# Patient Record
Sex: FEMALE | HISPANIC OR LATINO | Employment: FULL TIME
[De-identification: names, ages, dates, MRNs, and addresses within clinical notes are randomized per-mention and may not be internally consistent; named-entity substitution may affect disease eponyms.]

---

## 2022-02-14 ENCOUNTER — OFFICE VISIT (OUTPATIENT)
Dept: INTERNAL MEDICINE | Facility: OTHER | Age: 52
End: 2022-02-14
Payer: COMMERCIAL

## 2022-02-14 VITALS
HEIGHT: 63 IN | BODY MASS INDEX: 33.31 KG/M2 | TEMPERATURE: 98.3 F | SYSTOLIC BLOOD PRESSURE: 106 MMHG | HEART RATE: 68 BPM | WEIGHT: 188 LBS | OXYGEN SATURATION: 95 % | DIASTOLIC BLOOD PRESSURE: 66 MMHG

## 2022-02-14 DIAGNOSIS — E66.9 OBESITY (BMI 30-39.9): ICD-10-CM

## 2022-02-14 DIAGNOSIS — Z13.228 SCREENING FOR METABOLIC DISORDER: ICD-10-CM

## 2022-02-14 DIAGNOSIS — M05.711 RHEUMATOID ARTHRITIS INVOLVING RIGHT SHOULDER WITH POSITIVE RHEUMATOID FACTOR (HCC): ICD-10-CM

## 2022-02-14 PROCEDURE — 99204 OFFICE O/P NEW MOD 45 MIN: CPT | Mod: GC | Performed by: STUDENT IN AN ORGANIZED HEALTH CARE EDUCATION/TRAINING PROGRAM

## 2022-02-14 ASSESSMENT — PAIN SCALES - GENERAL: PAINLEVEL: 2=MINIMAL-SLIGHT

## 2022-02-14 ASSESSMENT — PATIENT HEALTH QUESTIONNAIRE - PHQ9: CLINICAL INTERPRETATION OF PHQ2 SCORE: 0

## 2022-02-15 PROBLEM — G57.40 TIBIAL NERVE LESION: Status: ACTIVE | Noted: 2017-11-03

## 2022-02-15 PROBLEM — M25.50 JOINT PAIN: Status: ACTIVE | Noted: 2020-11-03

## 2022-02-15 PROBLEM — E78.5 HYPERLIPIDEMIA: Status: ACTIVE | Noted: 2020-12-11

## 2022-02-15 PROBLEM — E66.9 OBESITY WITH BODY MASS INDEX 30 OR GREATER: Status: ACTIVE | Noted: 2020-11-03

## 2022-02-15 PROBLEM — M10.9 GOUT: Status: ACTIVE | Noted: 2020-11-03

## 2022-02-15 ASSESSMENT — ENCOUNTER SYMPTOMS
HEMOPTYSIS: 0
PSYCHIATRIC NEGATIVE: 1
DOUBLE VISION: 0
ABDOMINAL PAIN: 0
TINGLING: 0
SPUTUM PRODUCTION: 0
PALPITATIONS: 0
WEIGHT LOSS: 0
TREMORS: 0
NECK PAIN: 0
NAUSEA: 0
BACK PAIN: 0
PHOTOPHOBIA: 0
HEADACHES: 0
VOMITING: 0
CHILLS: 0
ORTHOPNEA: 0
FEVER: 0

## 2022-02-15 NOTE — PATIENT INSTRUCTIONS
Blood work to be done   Will send prescription of Methotrexate to your pharmacy   Will talk about preventive health on next follow up       Methotrexate tablets  What is this medicine?  METHOTREXATE (METH oh TREX ate) is a chemotherapy drug used to treat cancer including breast cancer, leukemia, and lymphoma. This medicine can also be used to treat psoriasis and certain kinds of arthritis.  This medicine may be used for other purposes; ask your health care provider or pharmacist if you have questions.  COMMON BRAND NAME(S): Rheumatrex, Trexall  What should I tell my health care provider before I take this medicine?  They need to know if you have any of these conditions:  · fluid in the stomach area or lungs  · if you often drink alcohol  · infection or immune system problems  · kidney disease or on hemodialysis  · liver disease  · low blood counts, like low white cell, platelet, or red cell counts  · lung disease  · radiation therapy  · stomach ulcers  · ulcerative colitis  · an unusual or allergic reaction to methotrexate, other medicines, foods, dyes, or preservatives  · pregnant or trying to get pregnant  · breast-feeding  How should I use this medicine?  Take this medicine by mouth with a glass of water. Follow the directions on the prescription label. Take your medicine at regular intervals. Do not take it more often than directed. Do not stop taking except on your doctor's advice.  Make sure you know why you are taking this medicine and how often you should take it. If this medicine is used for a condition that is not cancer, like arthritis or psoriasis, it should be taken weekly, NOT daily. Taking this medicine more often than directed can cause serious side effects, even death.  Talk to your healthcare provider about safe handling and disposal of this medicine. You may need to take special precautions.  Talk to your pediatrician regarding the use of this medicine in children. While this drug may be  prescribed for selected conditions, precautions do apply.  Overdosage: If you think you have taken too much of this medicine contact a poison control center or emergency room at once.  NOTE: This medicine is only for you. Do not share this medicine with others.  What if I miss a dose?  If you miss a dose, talk with your doctor or health care professional. Do not take double or extra doses.  What may interact with this medicine?  This medicine may interact with the following medication:  · acitretin  · aspirin and aspirin-like medicines including salicylates  · azathioprine  · certain antibiotics like penicillins, tetracycline, and chloramphenicol  · cyclosporine  · gold  · hydroxychloroquine  · live virus vaccines  · NSAIDs, medicines for pain and inflammation, like ibuprofen or naproxen  · other cytotoxic agents  · penicillamine  · phenylbutazone  · phenytoin  · probenecid  · retinoids such as isotretinoin and tretinoin  · steroid medicines like prednisone or cortisone  · sulfonamides like sulfasalazine and trimethoprim/sulfamethoxazole  · theophylline  This list may not describe all possible interactions. Give your health care provider a list of all the medicines, herbs, non-prescription drugs, or dietary supplements you use. Also tell them if you smoke, drink alcohol, or use illegal drugs. Some items may interact with your medicine.  What should I watch for while using this medicine?  Avoid alcoholic drinks.  This medicine can make you more sensitive to the sun. Keep out of the sun. If you cannot avoid being in the sun, wear protective clothing and use sunscreen. Do not use sun lamps or tanning beds/booths.  You may need blood work done while you are taking this medicine.  Call your doctor or health care professional for advice if you get a fever, chills or sore throat, or other symptoms of a cold or flu. Do not treat yourself. This drug decreases your body's ability to fight infections. Try to avoid being around  people who are sick.  This medicine may increase your risk to bruise or bleed. Call your doctor or health care professional if you notice any unusual bleeding.  Check with your doctor or health care professional if you get an attack of severe diarrhea, nausea and vomiting, or if you sweat a lot. The loss of too much body fluid can make it dangerous for you to take this medicine.  Talk to your doctor about your risk of cancer. You may be more at risk for certain types of cancers if you take this medicine.  Both men and women must use effective birth control with this medicine. Do not become pregnant while taking this medicine or until at least 1 normal menstrual cycle has occurred after stopping it. Women should inform their doctor if they wish to become pregnant or think they might be pregnant. Men should not father a child while taking this medicine and for 3 months after stopping it. There is a potential for serious side effects to an unborn child. Talk to your health care professional or pharmacist for more information. Do not breast-feed an infant while taking this medicine.  What side effects may I notice from receiving this medicine?  Side effects that you should report to your doctor or health care professional as soon as possible:  · allergic reactions like skin rash, itching or hives, swelling of the face, lips, or tongue  · breathing problems or shortness of breath  · diarrhea  · dry, nonproductive cough  · low blood counts - this medicine may decrease the number of white blood cells, red blood cells and platelets. You may be at increased risk for infections and bleeding.  · mouth sores  · redness, blistering, peeling or loosening of the skin, including inside the mouth  · signs of infection - fever or chills, cough, sore throat, pain or trouble passing urine  · signs and symptoms of bleeding such as bloody or black, tarry stools; red or dark-brown urine; spitting up blood or brown material that looks like  coffee grounds; red spots on the skin; unusual bruising or bleeding from the eye, gums, or nose  · signs and symptoms of kidney injury like trouble passing urine or change in the amount of urine  · signs and symptoms of liver injury like dark yellow or brown urine; general ill feeling or flu-like symptoms; light-colored stools; loss of appetite; nausea; right upper belly pain; unusually weak or tired; yellowing of the eyes or skin  Side effects that usually do not require medical attention (report to your doctor or health care professional if they continue or are bothersome):  · dizziness  · hair loss  · tiredness  · upset stomach  · vomiting  This list may not describe all possible side effects. Call your doctor for medical advice about side effects. You may report side effects to FDA at 4-415-WZN-7002.  Where should I keep my medicine?  Keep out of the reach of children.  Store at room temperature between 20 and 25 degrees C (68 and 77 degrees F). Protect from light. Throw away any unused medicine after the expiration date.  NOTE: This sheet is a summary. It may not cover all possible information. If you have questions about this medicine, talk to your doctor, pharmacist, or health care provider.  © 2020 Elsevier/Gold Standard (2018-08-09 13:38:43)      Rheumatoid Arthritis  Rheumatoid arthritis (RA) is a long-term (chronic) disease. RA causes inflammation in your joints. Your joints may feel painful, stiff, swollen, and warm. RA may start slowly. It most often affects the small joints of the hands and feet. It can also affect other parts of the body. Symptoms of RA often come and go.  There is no cure for RA, but medicines can help your symptoms.  What are the causes?  · RA is an autoimmune disease. This means that your body's defense system (immune system) attacks healthy parts of your body by mistake. The exact cause of RA is not known.  What increases the risk?  · Being a woman.  · Having a family history of  RA or other diseases like RA.  · Smoking.  · Being overweight.  · Being exposed to pollutants or chemicals.  What are the signs or symptoms?  · Morning stiffness that lasts longer than 30 minutes. This is often the first symptom.  · Symptoms start slowly. They are often worse in the morning.  · As RA gets worse, symptoms may include:  ? Pain, stiffness, swelling, warmth, and tenderness in joints on both sides of your body.  ? Loss of energy.  ? Not feeling hungry.  ? Weight loss.  ? A low fever.  ? Dry eyes and a dry mouth.  ? Firm lumps that grow under your skin.  ? Changes in the way your joints look.  ? Changes in the way your joints work.  · Symptoms vary and they:  ? Often come and go.  ? Sometimes get worse for a period of time. These are called flares.  How is this treated?    · Treatment may include:  ? Taking good care of yourself. Be sure to rest as needed, eat a healthy diet, and exercise.  ? Medicines. These may include:  § Pain relievers.  § Medicines to help with inflammation.  § Disease-modifying antirheumatic drugs (DMARDs).  § Medicines called biologic response modifiers.  ? Physical therapy and occupational therapy.  ? Surgery, if joint damage is very bad.  Your doctor will work with you to find the best treatments.  Follow these instructions at home:  Activity  · Return to your normal activities as told by your doctor. Ask your doctor what activities are safe for you.  · Rest when you have a flare.  · Exercise as told by your doctor.  General instructions  · Take over-the-counter and prescription medicines only as told by your doctor.  · Keep all follow-up visits as told by your doctor. This is important.  Where to find more information  · American College of Rheumatology: www.rheumatology.org  · Arthritis Foundation: www.arthritis.org  Contact a doctor if:  · You have a flare.  · You have a fever.  · You have problems because of your medicines.  Get help right away if:  · You have chest  pain.  · You have trouble breathing.  · You get a hot, painful joint all of a sudden, and it is worse than your normal joint aches.  Summary  · RA is a long-term disease.  · Symptoms of RA start slowly. They are often worse in the morning.  · RA causes inflammation in your joints.  This information is not intended to replace advice given to you by your health care provider. Make sure you discuss any questions you have with your health care provider.  Document Released: 03/11/2013 Document Revised: 08/21/2019 Document Reviewed: 08/21/2019  Elsevier Patient Education © 2020 Elsevier Inc.

## 2022-02-15 NOTE — PROGRESS NOTES
New Patient    Chief Complaint   Patient presents with   • New Patient     Arthritis       HPI:   jimi Lockett is a 51 y.o. female with a pmh of Rheumatoid arthritis (previously on methotrexate) , HLD, Obesity - BMI 33, ?SVT s/p ablation at [place] in  who presented to the clinic to establish.     She [facility] Jael Welch. Last seen 6 months ago, per patient she was unable to reach out to their office for further follow up.     Rheumatoid arthritis:  Diagnosed  , Anti CCP Ab 225, RA latex turbid 75.4 (records in [facility]). Was started on MTX by previous PCP with symptom relief. Ran out 3 weeks ago. Reports right shoulder and arm pain over the last week , has been taking Ibuprofen with some symptom relief.     Currently not taking any other medications.     Preventive Health:  Due for colonoscopy , mammogram and pap smear.   Vaccinated x 2 for COVID but not boosted.     Patient Active Problem List    Diagnosis Date Noted   • Hyperlipidemia 2020   • Gout 2020   • Joint pain 2020   • Obesity with body mass index 30 or greater 2020   • Tibial nerve lesion 2017   • Lateral epicondylitis of right elbow 2012   • Carpal tunnel syndrome of left wrist 2012   • Status post tonsillectomy 1990       Current Outpatient Medications   Medication Sig Dispense Refill   • methotrexate 2.5 MG Tab Take 6 Tablets by mouth every 7 days for 90 days. 72 Tablet 0   • ibuprofen (MOTRIN) 600 MG TABS Take 1 Tab by mouth every 8 hours as needed (for pain, take with food). 30 Each 0     No current facility-administered medications for this visit.     Social History:  Denies tobacco , illicit drug use. Ocassional EtOH use.   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     Family History:  Mother  of non alcoholic liver cirrhosis       ROS:   Review of Systems   Constitutional: Negative for chills, fever, malaise/fatigue and weight loss.  "  HENT: Negative for ear pain and tinnitus.    Eyes: Negative for double vision and photophobia.   Respiratory: Negative for hemoptysis and sputum production.    Cardiovascular: Negative for chest pain, palpitations and orthopnea.   Gastrointestinal: Negative for abdominal pain, nausea and vomiting.   Genitourinary: Negative for frequency and urgency.   Musculoskeletal: Positive for joint pain. Negative for back pain and neck pain.   Skin: Negative for rash.   Neurological: Negative for tingling, tremors and headaches.   Psychiatric/Behavioral: Negative.         /66 (BP Location: Left arm, Patient Position: Sitting, BP Cuff Size: Large adult long)   Pulse 68   Temp 36.8 °C (98.3 °F) (Temporal)   Ht 1.6 m (5' 3\")   Wt 85.3 kg (188 lb)   SpO2 95%   BMI 33.30 kg/m²     Physical Exam   Physical Exam  Vitals reviewed.   Constitutional:       General: She is not in acute distress.     Appearance: Normal appearance. She is obese.   HENT:      Head: Normocephalic.      Right Ear: Tympanic membrane normal.      Left Ear: Tympanic membrane normal.      Nose: Nose normal.      Mouth/Throat:      Mouth: Mucous membranes are dry.   Eyes:      Extraocular Movements: Extraocular movements intact.      Conjunctiva/sclera: Conjunctivae normal.      Pupils: Pupils are equal, round, and reactive to light.   Cardiovascular:      Rate and Rhythm: Normal rate and regular rhythm.      Pulses: Normal pulses.      Heart sounds: Normal heart sounds. No murmur heard.      Pulmonary:      Effort: Pulmonary effort is normal. No respiratory distress.      Breath sounds: Normal breath sounds. No wheezing.   Abdominal:      General: There is no distension.      Palpations: Abdomen is soft. There is no mass.      Tenderness: There is no abdominal tenderness.   Musculoskeletal:         General: Deformity (right elbow from remote injury ) and signs of injury (left ankle remote injury ) present.      Cervical back: Normal range of motion " and neck supple. No rigidity.   Skin:     Coloration: Skin is not jaundiced or pale.      Findings: No bruising.   Neurological:      General: No focal deficit present.      Mental Status: She is alert and oriented to person, place, and time.   Psychiatric:         Mood and Affect: Mood normal.            Note: I have reviewed all pertinent labs and diagnostic tests associated with this visit with specific comments listed under the assessment and plan below    Assessment and Plan    1. Screening for metabolic disorder  Blood work ordered. Previous notes suggest hx of HLD but patient was not taking statin.     - Comp Metabolic Panel; Future  - Lipid Profile; Future  - TSH WITH REFLEX TO FT4; Future  - VITAMIN B12; Future  - HEMOGLOBIN A1C; Future    2. Rheumatoid arthritis involving right shoulder with positive rheumatoid factor (HCC)    Diagnosed in 2020 , Anti CCP Ab 225, RA Latex turbid 75.4.   On MTX 15 mg weekly , last taken 3 weeks ago before she ran out.   We will get baseline labs, reinitiate MTX at same dose , 15 mg weekly.   Emphasized regular follow up for labs, will be checking labs every 3 months if the initial blood work returns normal.    - methotrexate 2.5 MG Tab; Take 6 Tablets by mouth every 7 days for 90 days.  Dispense: 72 Tablet; Refill: 0    3. Obesity (BMI 30-39.9)    Counseled on regular exercise and dietary modification.   Education material provided.   Labs to screen for metabolic syndrome.    - CBC WITH DIFFERENTIAL; Future  - Comp Metabolic Panel; Future  - Lipid Profile; Future       Followup: Return in about 5 weeks (around 3/21/2022).   Will discuss preventive health in detail on next f/u.     Patient seen with attending.    Signed by: Kali Jha M.D.    Please note that this dictation was created using voice recognition software. I have made every reasonable attempt to correct obvious errors, but I expect that there are errors of grammar and possibly content that I did not discover  before finalizing the note.

## 2022-02-26 ENCOUNTER — HOSPITAL ENCOUNTER (OUTPATIENT)
Dept: LAB | Facility: MEDICAL CENTER | Age: 52
End: 2022-02-26
Attending: STUDENT IN AN ORGANIZED HEALTH CARE EDUCATION/TRAINING PROGRAM
Payer: COMMERCIAL

## 2022-02-26 DIAGNOSIS — E66.9 OBESITY (BMI 30-39.9): ICD-10-CM

## 2022-02-26 DIAGNOSIS — Z13.228 SCREENING FOR METABOLIC DISORDER: ICD-10-CM

## 2022-02-26 LAB
ALBUMIN SERPL BCP-MCNC: 4 G/DL (ref 3.2–4.9)
ALBUMIN/GLOB SERPL: 1.4 G/DL
ALP SERPL-CCNC: 89 U/L (ref 30–99)
ALT SERPL-CCNC: 16 U/L (ref 2–50)
ANION GAP SERPL CALC-SCNC: 12 MMOL/L (ref 7–16)
AST SERPL-CCNC: 19 U/L (ref 12–45)
BASOPHILS # BLD AUTO: 0.9 % (ref 0–1.8)
BASOPHILS # BLD: 0.07 K/UL (ref 0–0.12)
BILIRUB SERPL-MCNC: 0.3 MG/DL (ref 0.1–1.5)
BUN SERPL-MCNC: 17 MG/DL (ref 8–22)
CALCIUM SERPL-MCNC: 9.2 MG/DL (ref 8.5–10.5)
CHLORIDE SERPL-SCNC: 109 MMOL/L (ref 96–112)
CHOLEST SERPL-MCNC: 216 MG/DL (ref 100–199)
CO2 SERPL-SCNC: 21 MMOL/L (ref 20–33)
CREAT SERPL-MCNC: 0.62 MG/DL (ref 0.5–1.4)
EOSINOPHIL # BLD AUTO: 0.24 K/UL (ref 0–0.51)
EOSINOPHIL NFR BLD: 2.9 % (ref 0–6.9)
ERYTHROCYTE [DISTWIDTH] IN BLOOD BY AUTOMATED COUNT: 44 FL (ref 35.9–50)
EST. AVERAGE GLUCOSE BLD GHB EST-MCNC: 114 MG/DL
GLOBULIN SER CALC-MCNC: 2.9 G/DL (ref 1.9–3.5)
GLUCOSE SERPL-MCNC: 88 MG/DL (ref 65–99)
HBA1C MFR BLD: 5.6 % (ref 4–5.6)
HCT VFR BLD AUTO: 41.9 % (ref 37–47)
HDLC SERPL-MCNC: 44 MG/DL
HGB BLD-MCNC: 14.1 G/DL (ref 12–16)
IMM GRANULOCYTES # BLD AUTO: 0.02 K/UL (ref 0–0.11)
IMM GRANULOCYTES NFR BLD AUTO: 0.2 % (ref 0–0.9)
LDLC SERPL CALC-MCNC: 141 MG/DL
LYMPHOCYTES # BLD AUTO: 2.69 K/UL (ref 1–4.8)
LYMPHOCYTES NFR BLD: 32.9 % (ref 22–41)
MCH RBC QN AUTO: 29.8 PG (ref 27–33)
MCHC RBC AUTO-ENTMCNC: 33.7 G/DL (ref 33.6–35)
MCV RBC AUTO: 88.6 FL (ref 81.4–97.8)
MONOCYTES # BLD AUTO: 0.78 K/UL (ref 0–0.85)
MONOCYTES NFR BLD AUTO: 9.5 % (ref 0–13.4)
NEUTROPHILS # BLD AUTO: 4.38 K/UL (ref 2–7.15)
NEUTROPHILS NFR BLD: 53.6 % (ref 44–72)
NRBC # BLD AUTO: 0 K/UL
NRBC BLD-RTO: 0 /100 WBC
PLATELET # BLD AUTO: 318 K/UL (ref 164–446)
PMV BLD AUTO: 10.8 FL (ref 9–12.9)
POTASSIUM SERPL-SCNC: 4.4 MMOL/L (ref 3.6–5.5)
PROT SERPL-MCNC: 6.9 G/DL (ref 6–8.2)
RBC # BLD AUTO: 4.73 M/UL (ref 4.2–5.4)
SODIUM SERPL-SCNC: 142 MMOL/L (ref 135–145)
TRIGL SERPL-MCNC: 157 MG/DL (ref 0–149)
TSH SERPL DL<=0.005 MIU/L-ACNC: 2.02 UIU/ML (ref 0.38–5.33)
VIT B12 SERPL-MCNC: 411 PG/ML (ref 211–911)
WBC # BLD AUTO: 8.2 K/UL (ref 4.8–10.8)

## 2022-02-26 PROCEDURE — 84443 ASSAY THYROID STIM HORMONE: CPT

## 2022-02-26 PROCEDURE — 83036 HEMOGLOBIN GLYCOSYLATED A1C: CPT

## 2022-02-26 PROCEDURE — 80053 COMPREHEN METABOLIC PANEL: CPT

## 2022-02-26 PROCEDURE — 82607 VITAMIN B-12: CPT

## 2022-02-26 PROCEDURE — 80061 LIPID PANEL: CPT

## 2022-02-26 PROCEDURE — 36415 COLL VENOUS BLD VENIPUNCTURE: CPT

## 2022-02-26 PROCEDURE — 85025 COMPLETE CBC W/AUTO DIFF WBC: CPT

## 2022-02-28 ENCOUNTER — TELEPHONE (OUTPATIENT)
Dept: INTERNAL MEDICINE | Facility: OTHER | Age: 52
End: 2022-02-28
Payer: COMMERCIAL

## 2022-03-01 NOTE — RESULT ENCOUNTER NOTE
I have reviewed your labs including blood count, metabolic panel, glycohemoglobin, Vit B12 and thyroid panel which are all unremarkable.   Your lipid panel has demonstrated elevated total cholesterol and LDL (bad cholesterol). After calculating your risk for cardiovascular event is low (1.4% in next 10 years) so I don't recommend medication to control your hyperlipidemia. Instead I would recommend healthy lifestyle changes including dietary changes (low carb/fat diet), more fruits and vegetables and regular exercise 30 mins-5 times a day.     Follow up on your scheduled appt.   Call with any questions.

## 2022-03-01 NOTE — TELEPHONE ENCOUNTER
VOICEMAIL  1. Caller Name: Melody                     Call Back Number: [phone]    2. Message: Pt lvm stating she would like a call back with lab results. pcp please advise

## 2022-03-02 ENCOUNTER — TELEPHONE (OUTPATIENT)
Dept: INTERNAL MEDICINE | Facility: OTHER | Age: 52
End: 2022-03-02
Payer: COMMERCIAL

## 2022-03-02 NOTE — TELEPHONE ENCOUNTER
Called pt on [phone] and left VM. Briefly discussed the recent labs and asked to call office for any questions.   See note in chart for lab interpretation and recommendations.   She will follow up with me 3/21/22.

## 2022-03-03 NOTE — TELEPHONE ENCOUNTER
Pt was in to see Dr Jha on 02/14/22 and was ordered a prescription that says it went to [facility] pharmacy on [address].  Pt says she has gone to the pharmacy 3 times and was told by the pharmacy that it was not ordered.  Please assist.

## 2022-03-03 NOTE — TELEPHONE ENCOUNTER
Spoke to pharmacy state they never received rx for methotrexate ,  Pharmacist took verbal , patient was notified rx called into pharmacy

## 2022-03-09 ENCOUNTER — OFFICE VISIT (OUTPATIENT)
Dept: INTERNAL MEDICINE | Facility: OTHER | Age: 52
End: 2022-03-09
Payer: COMMERCIAL

## 2022-03-09 VITALS
TEMPERATURE: 98.7 F | SYSTOLIC BLOOD PRESSURE: 104 MMHG | HEART RATE: 67 BPM | OXYGEN SATURATION: 97 % | BODY MASS INDEX: 32.99 KG/M2 | WEIGHT: 186.2 LBS | DIASTOLIC BLOOD PRESSURE: 67 MMHG | HEIGHT: 63 IN

## 2022-03-09 DIAGNOSIS — M06.9 RHEUMATOID ARTHRITIS FLARE (HCC): ICD-10-CM

## 2022-03-09 PROCEDURE — 99213 OFFICE O/P EST LOW 20 MIN: CPT | Mod: GE | Performed by: STUDENT IN AN ORGANIZED HEALTH CARE EDUCATION/TRAINING PROGRAM

## 2022-03-09 RX ORDER — PREDNISONE 2.5 MG/1
TABLET ORAL
Qty: 45 TABLET | Refills: 0 | Status: SHIPPED | OUTPATIENT
Start: 2022-03-09 | End: 2022-03-24

## 2022-03-09 ASSESSMENT — FIBROSIS 4 INDEX: FIB4 SCORE: 0.76

## 2022-03-10 ENCOUNTER — TELEPHONE (OUTPATIENT)
Dept: INTERNAL MEDICINE | Facility: OTHER | Age: 52
End: 2022-03-10
Payer: COMMERCIAL

## 2022-03-10 NOTE — PATIENT INSTRUCTIONS
-Exercise and balanced nutrition  -Take medications as prescribed  -Follow-up with specialties  -Follow-up with PCP as scheduled or return to clinic earlier if any symptoms.

## 2022-03-10 NOTE — PROGRESS NOTES
"    Established Patient    Patient Care Team:  Kali Jha M.D. as PCP - General (Internal Medicine)    Melody Lockett is a 51 y.o. female who presents today with the following Chief Complaint(s): Follow up for The encounter diagnosis was Rheumatoid arthritis flare (HCC).    HPI:    51 year old female with known hx of RA came for a follow up visit to discuss labs and also mentioned that there was a delay in receiving her methotrexate and now she is complaining of joint pains in multiple joints including small joints of hand, elbow, knees and hip. She denied any other complaints.    No problems updated.     ROS:     Denies any new chest pain or shortness of breath.  No changes to urinary or bowel function. See HPI.    No past medical history on file.  Social History     Tobacco Use   • Smoking status: Never Smoker   • Smokeless tobacco: Never Used   Substance Use Topics   • Alcohol use: No   • Drug use: No     Current Outpatient Medications   Medication Sig Dispense Refill   • prednisONE (DELTASONE) 2.5 MG Tab Take 4 Tablets by mouth every day for 5 days, THEN 3 Tablets every day for 5 days, THEN 2 Tablets every day for 5 days. 45 Tablet 0   • methotrexate 2.5 MG Tab Take 6 Tablets by mouth every 7 days for 90 days. 72 Tablet 0   • ibuprofen (MOTRIN) 600 MG TABS Take 1 Tab by mouth every 8 hours as needed (for pain, take with food). 30 Each 0     No current facility-administered medications for this visit.     Physical Exam:  /67 (BP Location: Right arm, Patient Position: Sitting, BP Cuff Size: Adult)   Pulse 67   Temp 37.1 °C (98.7 °F) (Temporal)   Ht 1.6 m (5' 3\")   Wt 84.5 kg (186 lb 3.2 oz)   SpO2 97%   BMI 32.98 kg/m²   General: Well developed, well nourished female, in no distress.  Eyes: Conjuntiva without any obvious injection or erythema.   Cardiovascular: Heart is regular with no murmur  Lungs: Clear to auscultation bilaterally. No wheezes, rhonci or crackles heard. Respiratory effort is " normal.  Abd: Soft, non-tender  Ext: tenderness and swelling of small joints of hand, wrist and elbow, no erythema appreciated.    Assessment and Plan:   1. Rheumatoid arthritis flare (HCC)  -Appears to be RA flare based on hx, symptoms and examination.  -patient has been taking ibuprofen regularly with no relief  -Prednisone taper prescribed, education and counseling provided.  - Referral to Rheumatology    #Labs reviewed which are unremarkable except for hyperlipidemia, ASCVD <5, no indication for statin at this point, recommended regular exercise and balanced diet, will follow up.     Return in about 3 months (around 6/9/2022).  Patient Instructions   -Exercise and balanced nutrition  -Take medications as prescribed  -Follow-up with specialties  -Follow-up with PCP as scheduled or return to clinic earlier if any symptoms.

## 2022-03-28 DIAGNOSIS — M05.711 RHEUMATOID ARTHRITIS INVOLVING RIGHT SHOULDER WITH POSITIVE RHEUMATOID FACTOR (HCC): ICD-10-CM

## 2022-03-28 NOTE — TELEPHONE ENCOUNTER
Methotrexate Refill    Last seen: 3/9/22 by Dr. Gongora  Next appt: 5/19/22 with Dr. Gongora    Was the patient seen in the last year in this department? Yes   Does patient have an active prescription for medications requested? No   Received Request Via: Pharmacy

## 2022-03-30 DIAGNOSIS — M05.711 RHEUMATOID ARTHRITIS INVOLVING RIGHT SHOULDER WITH POSITIVE RHEUMATOID FACTOR (HCC): ICD-10-CM

## 2022-03-30 NOTE — TELEPHONE ENCOUNTER
I had sent script for MTX 2/14 for 90 days, End date 5/15. Not sure why are they requesting further refills. Kindly confirm. Thanks

## 2022-07-18 NOTE — TELEPHONE ENCOUNTER
Patient requesting for a refill of MTX 2.5mg take 6 tablets every 7 days     Last seen: 3/9/22   by Dr. Gongora Next appt: None      Does patient have an active prescription for medications requested? No    Received Request Via: Patient

## 2022-07-26 ENCOUNTER — APPOINTMENT (OUTPATIENT)
Dept: INTERNAL MEDICINE | Facility: OTHER | Age: 52
End: 2022-07-26
Payer: COMMERCIAL

## 2022-08-02 ENCOUNTER — OFFICE VISIT (OUTPATIENT)
Dept: INTERNAL MEDICINE | Facility: OTHER | Age: 52
End: 2022-08-02
Payer: COMMERCIAL

## 2022-08-02 VITALS
DIASTOLIC BLOOD PRESSURE: 83 MMHG | TEMPERATURE: 98.4 F | BODY MASS INDEX: 30.44 KG/M2 | SYSTOLIC BLOOD PRESSURE: 118 MMHG | OXYGEN SATURATION: 98 % | HEART RATE: 88 BPM | HEIGHT: 63 IN | WEIGHT: 171.8 LBS

## 2022-08-02 DIAGNOSIS — M05.721 RHEUMATOID ARTHRITIS INVOLVING RIGHT ELBOW WITH POSITIVE RHEUMATOID FACTOR (HCC): ICD-10-CM

## 2022-08-02 DIAGNOSIS — Z79.631 METHOTREXATE, LONG TERM, CURRENT USE: ICD-10-CM

## 2022-08-02 DIAGNOSIS — Z00.00 ENCOUNTER FOR MEDICAL EXAMINATION TO ESTABLISH CARE: ICD-10-CM

## 2022-08-02 PROCEDURE — 99213 OFFICE O/P EST LOW 20 MIN: CPT | Mod: GE | Performed by: STUDENT IN AN ORGANIZED HEALTH CARE EDUCATION/TRAINING PROGRAM

## 2022-08-02 ASSESSMENT — FIBROSIS 4 INDEX: FIB4 SCORE: 0.78

## 2022-08-02 NOTE — PATIENT INSTRUCTIONS
Thank you for visiting today!  Please follow-up in 3 months  Please let us know when you switch insurance, so we can put in the referrals for rheumatology, the colonoscopy, and the mammogram.  Please get your next COVID booster.  Please get lab work done at least 5 days before next visit.  Please try and eat healthy, get at least 30 minutes of cardiovascular exercise a day to help keep your health as best as it can be.  If you have any questions or concerns please feel free to contact us at [phone].  If you feel like you are experiencing a medical emergency please seek immediate medical attention at an urgent care or in the emergency department.

## 2022-08-02 NOTE — PROGRESS NOTES
"    Established Patient    Patient Care Team:  Woodrow Mason M.D. as PCP - General (Internal Medicine)    Melody Lockett is a 52 y.o. female who presents today with the following Chief Complaint(s): Follow up for Diagnoses of Rheumatoid arthritis involving right elbow with positive rheumatoid factor (HCC), Encounter for medical examination to establish care, and Methotrexate, long term, current use were pertinent to this visit.    HPI:    1. Rheumatoid arthritis involving right elbow with positive rheumatoid factor (HCC)  Diagnosed 2020 , Anti CCP Ab 225, RA latex turbid 75.4 (records in [facility]). Was started on MTX by previous PCP with symptom relief. Ran out 2 weeks ago. Reports right shoulder and arm pain over the last week, but very mild, patient states that losing weight approximately 45 pounds within the last year eating a healthy diet low\" anti-inflammatory foods.\"  Greatly improved her RA.  Patient does not feel like she is currently \"in a flare.\"  Patient did not want to see rheumatologist as she is going to change insurance soon, and otherwise thinks her disease is well controlled on her current medication regiment, but is open to seeing a rheumatologist in the future if necessary.    2. Encounter for medical examination to establish care  Patient here today to encounter care, patient is a very pleasant 52-year-old female, excited to establish care.  Patient also wishes to discuss her RA, she has been out of her medication for some several weeks now.    3. Methotrexate, long term, current use  Patient endorses long-term use of methotrexate for control of RA.  Previous labs reviewed without any signs or symptoms of liver, stem cell, or lung damage.      ROS:     General: No fevers, chills, night sweats, weight loss or gain  HEENT: No hearing changes, vision changes, eye pain, ear pain, nasal discharge, sore throat  Neck: No swelling in neck  Pulmonary: No shortness of breath, cough, sputum, or " "hemoptysis  Cardiovascular: No chest pain, palpitations, or LE swelling  GI: No nausea, vomiting, diarrhea, constipation, abdominal pain, hematochezia or melena  : No dysuria or frequency  Neuro: No focal weakness, no general weakness, no headaches, no lightheadedness, no dizziness  Psych: No anxiety or depression    Past Medical History:   Diagnosis Date   • Carpal tunnel syndrome of left wrist 2/16/2012   • Status post tonsillectomy 11/3/1990     Social History     Tobacco Use   • Smoking status: Never Smoker   • Smokeless tobacco: Never Used   Vaping Use   • Vaping Use: Never used   Substance Use Topics   • Alcohol use: No   • Drug use: No     Current Outpatient Medications   Medication Sig Dispense Refill   • methotrexate 2.5 MG Tab Take 6 Tablets by mouth every 7 days. 72 Tablet 1     No current facility-administered medications for this visit.       Physical Exam:  /83 (BP Location: Left arm, Patient Position: Sitting, BP Cuff Size: Adult)   Pulse 88   Temp 36.9 °C (98.4 °F) (Temporal)   Ht 1.6 m (5' 3\")   Wt 77.9 kg (171 lb 12.8 oz)   SpO2 98%   BMI 30.43 kg/m²   General: Well developed, well nourished female, in no distress.  HEENT: NC/AT, PERRL, EOMI, no scleral icterus or conjunctival pallor, fair dentition, no nasal discharge or oral erythema or exudates.   Neck: Supple, No cervical or supraclavicular LAD  CV:RRR, no murmurs gallops or Rubs, no JVD  Pulm: LCAB, no crackles, rales, rhonchi, or wheezing  GI: Normal bowel sounds, abdomen soft, nontender, nondistended to deep or light palpation in all 4 quadrants, no HSM.  MSK: Left ankle with scar from ankle fracture in the past, right elbow without any overt warmth, no deformity, radial and dorsalis pedis pulses 2+ and equal bilaterally, respectively.  Strength 5 out of 5 in upper and lower extremities.  No lower extremity edema  Neuro: Patient is alert and oriented x3, no focal deficits  Psych: Appropriate mood and affect       Assessment and " "Plan:   1. Rheumatoid arthritis involving right elbow with positive rheumatoid factor (HCC)  Patient with a history of seropositive rheumatoid arthritis, previously treated by my colleague on methotrexate 15 mg daily, tolerating well, patient is recently ran out of medications and is in need of surveillance, discussed this with the patient who is agreeable to follow-up.  - Methotrexate 15 mg weekly  -Patient also is agreeable to follow-up with rheumatology once she switches insurance  - CBC WITH DIFFERENTIAL; Future  - Comp Metabolic Panel; Future    2. Encounter for medical examination to establish care  Patient presents today to establish care, past medical history, surgical history, medication use, allergies, family, and social history reviewed.    3. Methotrexate, long term, current use  Patient will need surveillance for long-term methotrexate use, concern for lung, liver, bone marrow toxicity with long-term use of this drug, requires least surveillance every 3 months, which patient is agreeable to currently, patient also states that she is menopausal for some 3 years now there is no chance that she is pregnant.  - CBC WITH DIFFERENTIAL; Future  - Comp Metabolic Panel; Future  -Follow-up in 3 months    Healthcare Maintenance    Colonoscopy: No history of colonoscopy, agreeable to follow-up  HIV:endorses negative  Syphilis:endorses negative  Hep C: Endorses negative results in the past  A1c: 5.4, 3/22  ASCVD: 2.2% 3/2022  Smoking: Denies use  Alcohol Use: Denies use \"only a shot of whiskey on my birthday\"  Recreational Drugs: Denies use  Healthy Eating/Exercise: Yes  Depression and Anxiety: Denies  Immunizations: Patient did get COVID booster 6/22, has had full series of vaccination per patient, not recorded in chart.    Patient is postmenopausal, last menstrual period approximately 3 years ago.   PAP: Follows with OB/GYN last 3 years ago, patient states normal, encouraged follow-up.        Patient Instructions "   Thank you for visiting today!  Please follow-up in 3 months  Please let us know when you switch insurance, so we can put in the referrals for rheumatology, the colonoscopy, and the mammogram.  Please get your next COVID booster.  Please get lab work done at least 5 days before next visit.  Please try and eat healthy, get at least 30 minutes of cardiovascular exercise a day to help keep your health as best as it can be.  If you have any questions or concerns please feel free to contact us at [phone].  If you feel like you are experiencing a medical emergency please seek immediate medical attention at an urgent care or in the emergency department.       Woodrow Mason M.D. PGY I  [facility] [employer]    This note was created using voice recognition software.  While every attempt is made to ensure accuracy of transcription, occasionally errors occur.

## 2023-02-06 ENCOUNTER — OFFICE VISIT (OUTPATIENT)
Dept: INTERNAL MEDICINE | Facility: OTHER | Age: 53
End: 2023-02-06
Payer: COMMERCIAL

## 2023-02-06 VITALS
HEART RATE: 60 BPM | BODY MASS INDEX: 28.17 KG/M2 | TEMPERATURE: 98.4 F | HEIGHT: 63 IN | OXYGEN SATURATION: 99 % | SYSTOLIC BLOOD PRESSURE: 112 MMHG | WEIGHT: 159 LBS | DIASTOLIC BLOOD PRESSURE: 62 MMHG

## 2023-02-06 DIAGNOSIS — M05.721 RHEUMATOID ARTHRITIS INVOLVING RIGHT ELBOW WITH POSITIVE RHEUMATOID FACTOR (HCC): ICD-10-CM

## 2023-02-06 DIAGNOSIS — R45.86 MOOD CHANGE: ICD-10-CM

## 2023-02-06 PROCEDURE — 99214 OFFICE O/P EST MOD 30 MIN: CPT | Mod: GC | Performed by: STUDENT IN AN ORGANIZED HEALTH CARE EDUCATION/TRAINING PROGRAM

## 2023-02-06 RX ORDER — FOLIC ACID 1 MG/1
1 TABLET ORAL DAILY
Qty: 90 TABLET | Refills: 1 | Status: SHIPPED | OUTPATIENT
Start: 2023-02-06 | End: 2023-08-21 | Stop reason: SDUPTHER

## 2023-02-06 ASSESSMENT — FIBROSIS 4 INDEX: FIB4 SCORE: 0.78

## 2023-02-06 ASSESSMENT — PATIENT HEALTH QUESTIONNAIRE - PHQ9: CLINICAL INTERPRETATION OF PHQ2 SCORE: 0

## 2023-02-06 NOTE — PROGRESS NOTES
Established Patient    Melody Lockett is a 52 y.o. female who presents today with the following:    CC:   Chief Complaint   Patient presents with    Medication Refill    Referral Needed     Rheumatologist        HPI:     Pt is a 51 y/o F with PMH significant for RA (on MTX), carpal tunnel syndrome, here for the issues listed below. She is a patient of Dr. Woodrow Mason.    RA: Pt is here for refill of MTX. 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 She has to take ibuprofen and is alternating it with acetaminophen and does not want to take more of them because of the side effects of the medications; her symptoms are alleviated by MTX. She ran out of MTX 3 months ago.     Mood issues: Patient thinks that she has been overreacting and having increases in anger for the last 1 month. Triggers include loud noises and people honking at her when she is driving. Her daughter has told her that her temper is shorter; her aunt told her that normally she is patient and quiet; however now she has been changing. She started having menopause in 2019 (age 49) with symptoms resolving within 1 year. Symptoms included fatigue and hot flashes. She does not have hot flashes anymore and she does not take any estrogen supplements. She thinks it may be due to not being able to take MTX (running out).    S: Yes   I: No  G: Yes  E: Yes  C: No  A: No  P: No  S: No    SIGECAPS 3/8.    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 72 Tablet 1     No current facility-administered medications for this visit.       There were no vitals taken for this visit.    PHYSICAL EXAM:  Physical Exam  Vitals reviewed.   Constitutional:       General: She is not in acute distress.     Appearance: Normal appearance. She is normal weight.   HENT:      Head: Normocephalic.      Right Ear: Tympanic membrane normal.      Left Ear: Tympanic membrane normal.      Nose: Nose normal.      Mouth/Throat:      Mouth: Mucous membranes are moist.      Pharynx: Oropharynx is clear.   Eyes:      Extraocular Movements: Extraocular movements intact.      Conjunctiva/sclera: Conjunctivae normal.      Pupils: Pupils are equal, round, and reactive to light.   Cardiovascular:      Rate and Rhythm: Normal rate and regular rhythm.      Pulses: Normal pulses.      Heart sounds: Normal heart sounds. No murmur heard.  Pulmonary:      Effort: Pulmonary effort is normal. No respiratory distress.      Breath sounds: Normal breath sounds. No wheezing.   Abdominal:      General: There is no distension.      Palpations: Abdomen is soft. There is no mass.      Tenderness: There is no abdominal tenderness.   Musculoskeletal:         General: Swelling, deformity and signs of injury present.      Cervical back: Normal range of motion and neck supple. No rigidity.      Comments: Ulnar deviation of R 5th finger, and pain upon palpation of right elbow. Both joints are swollen and tender to palpation    Skin:     Coloration: Skin is not jaundiced or pale.      Findings: No bruising.   Neurological:      General: No focal deficit present.      Mental Status: She is alert and oriented to person, place, and time. Mental status is at baseline.      Cranial Nerves: No cranial nerve deficit.   Psychiatric:         Mood and Affect: Mood normal.         Behavior: Behavior normal.         Thought Content: Thought content normal.         Judgment: Judgment normal.         Assessment and Plan  1. Rheumatoid arthritis involving right elbow with positive rheumatoid factor (HCC)  This is a chronic problem, currently uncontrolled. Patient has been well controlled on methotrexate; however this has not been well controlled currently because she has run out of her medication for the last 3 months.    Refilled methotrexate. I have asked the patient to monitor for symptoms of her rheumatoid arthritis to see if it is controlled with MTX. Also am starting folate supplements since methotrexate interferes with the metabolism of folate thus causing folate deficiency.    Ordering the following labs:  CBC, CMP, vitamin B12, RF, CCP, CRP/ESR/LISSETTE, CXR. Will refer to rheumatology after labs come back.    - methotrexate 2.5 MG Tab; Take 6 Tablets by mouth every 7 days.  Dispense: 72 Tablet; Refill: 1  - CBC WITH DIFFERENTIAL; Future  - Comp Metabolic Panel; Future  - VITAMIN B12; Future  - FOLATE; Future  - RHEUMATOID ARTHRITIS FACTOR; Future  - CCP ANTIBODY; Future  - Sed Rate; Future  - CRP QUANTITIVE (NON-CARDIAC); Future  - LISSETTE W/REFLEX IF POSITIVE  - folic acid (FOLVITE) 1 MG Tab; Take 1 Tablet by mouth every day.  Dispense: 90 Tablet; Refill: 1  - DX-CHEST-2 VIEWS; Future    2. Mood change  This is possibly due to rheumatoid arthritis being uncontrolled. Will also order TSH/FT4 to rule out possible underlying organic cause of hypo or hyperthyroidism.  - TSH+FREE T4    Follow up in 1 month for continued management of rheumatoid  arthritis.    Signed by: Mirta Jaquez M.D.

## 2023-02-06 NOTE — PATIENT INSTRUCTIONS
Hello! Today we saw you for:  -Rheumatoid arthritis  -Refilled methotrexate  -Ordered labs and chest xray    Please take methotrexate and folic acid supplements. Follow up with Dr. Mason in 1 month.

## 2023-08-21 DIAGNOSIS — M05.721 RHEUMATOID ARTHRITIS INVOLVING RIGHT ELBOW WITH POSITIVE RHEUMATOID FACTOR (HCC): ICD-10-CM

## 2023-08-21 RX ORDER — FOLIC ACID 1 MG/1
1 TABLET ORAL DAILY
Qty: 90 TABLET | Refills: 1 | Status: SHIPPED | OUTPATIENT
Start: 2023-08-21 | End: 2024-02-27 | Stop reason: SDUPTHER

## 2023-08-25 DIAGNOSIS — M05.721 RHEUMATOID ARTHRITIS INVOLVING RIGHT ELBOW WITH POSITIVE RHEUMATOID FACTOR (HCC): ICD-10-CM

## 2024-02-27 ENCOUNTER — OFFICE VISIT (OUTPATIENT)
Dept: INTERNAL MEDICINE | Facility: OTHER | Age: 54
End: 2024-02-27
Payer: COMMERCIAL

## 2024-02-27 VITALS
DIASTOLIC BLOOD PRESSURE: 69 MMHG | BODY MASS INDEX: 29.16 KG/M2 | HEART RATE: 68 BPM | OXYGEN SATURATION: 98 % | SYSTOLIC BLOOD PRESSURE: 107 MMHG | WEIGHT: 164.6 LBS | HEIGHT: 63 IN | TEMPERATURE: 97.9 F

## 2024-02-27 DIAGNOSIS — G89.29 CHRONIC MIDLINE LOW BACK PAIN WITHOUT SCIATICA: ICD-10-CM

## 2024-02-27 DIAGNOSIS — R68.89 TEMPERATURE INTOLERANCE: ICD-10-CM

## 2024-02-27 DIAGNOSIS — G47.00 INSOMNIA, UNSPECIFIED TYPE: ICD-10-CM

## 2024-02-27 DIAGNOSIS — M54.50 CHRONIC MIDLINE LOW BACK PAIN WITHOUT SCIATICA: ICD-10-CM

## 2024-02-27 DIAGNOSIS — M05.721 RHEUMATOID ARTHRITIS INVOLVING RIGHT ELBOW WITH POSITIVE RHEUMATOID FACTOR (HCC): ICD-10-CM

## 2024-02-27 PROCEDURE — 3074F SYST BP LT 130 MM HG: CPT

## 2024-02-27 PROCEDURE — 99214 OFFICE O/P EST MOD 30 MIN: CPT | Mod: GC

## 2024-02-27 PROCEDURE — 3078F DIAST BP <80 MM HG: CPT

## 2024-02-27 RX ORDER — LANOLIN ALCOHOL/MO/W.PET/CERES
3 CREAM (GRAM) TOPICAL
Qty: 50 TABLET | Refills: 0 | Status: SHIPPED | OUTPATIENT
Start: 2024-02-27 | End: 2024-03-27

## 2024-02-27 RX ORDER — METHOTREXATE 2.5 MG/1
7.5 TABLET ORAL
Qty: 21 TABLET | Refills: 0 | Status: SHIPPED | OUTPATIENT
Start: 2024-02-27 | End: 2024-04-16

## 2024-02-27 RX ORDER — OMEGA-3 FATTY ACIDS/FISH OIL 300-1000MG
CAPSULE ORAL
COMMUNITY

## 2024-02-27 RX ORDER — FOLIC ACID 1 MG/1
1 TABLET ORAL DAILY
Qty: 90 TABLET | Refills: 1 | Status: SHIPPED | OUTPATIENT
Start: 2024-02-27

## 2024-02-27 ASSESSMENT — PATIENT HEALTH QUESTIONNAIRE - PHQ9: CLINICAL INTERPRETATION OF PHQ2 SCORE: 0

## 2024-02-27 NOTE — PROGRESS NOTES
"      Office Visit Initial Encounter    Chief Complaint   Patient presents with    Other     Trouble regulating body heat. Has joint pain       HPI   Ms. Lockett is a 53 y/o F with PMH significant for RA, and carpal tunnel syndrome, who presents for establishment of care and with the following complains:     - Worsening wrist and elbow pain. She has a dx of RA since 2022 with X anti CCP and RF. She was started on methotrexate, which she stopped taking about 8 months ago because she wanted \"to control symptoms with low-glucose diet\". Since then, her bilateral wrist pain and swelling has been worsening and notices lower back pain that is also worse in the morning along with stiffness. Takes tylenol in the mornings to tolerate the pain throughout the day. Complains of chronic lower back pain radiating to the buttocks, along with stiffness in the mornings.     - Trouble  with temperature tolerance and mood changes. Screens negative for anxiety or depression but states issues with falling asleep when she gets home from work in the mornings. Has been more irritable than usual.       Past Medical History  Past Medical History:   Diagnosis Date    Carpal tunnel syndrome of left wrist 2/16/2012    Status post tonsillectomy 11/3/1990       Allergies:     Patient has no known allergies.    Medications    Current Outpatient Medications:     Ibuprofen, Take  by mouth., Taking    melatonin, 3 mg, Oral, QAM AC    folic acid, 1 mg, Oral, DAILY    methotrexate, 7.5 mg, Oral, Q7 DAYS    Family History  No family history on file.    Surgical History  Past Surgical History:   Procedure Laterality Date    TONSILLECTOMY         Social History   Social History     Tobacco Use    Smoking status: Never    Smokeless tobacco: Never   Vaping Use    Vaping Use: Never used   Substance Use Topics    Alcohol use: No    Drug use: No       ROS     General: No fevers, chills, night sweats, weight loss or gain.  H&N: No hearing changes, vision " "changes, eye pain, ear pain, nasal discharge, sore throat, no neck swelling.  Pulmonary: No shortness of breath, cough, sputum, or hemoptysis.  Cardiovascular: No chest pain, palpitations, or LE swelling.   GI: No nausea, vomiting, diarrhea, constipation, abdominal pain, hematochezia or melena.  : No dysuria, frequency, retention or hematuria.   Neuro: No headaches, no lightheadedness, no dizziness. No focal weakness, no general weakness. No gait disturbances.   Psych: No anxiety or depression.     Physical Exam     /69 (BP Location: Left arm, Patient Position: Sitting, BP Cuff Size: Adult)   Pulse 68   Temp 36.6 °C (97.9 °F) (Temporal)   Ht 1.6 m (5' 3\")   Wt 74.7 kg (164 lb 9.6 oz)   SpO2 98%   BMI 29.16 kg/m²     General: No acute distress, comfortable.   Head and Neck: NC/AT, EOMI, no scleral icterus or conjunctival pallor, no nasal discharge or oral erythema or exudates. Neck supple, no LADs.   CV: Rhythmic heart sounds, no murmurs, gallops or rubs. No S3,S4 or JVD. Dorsalis pedis/posterior tibial pulses present, symmetrical.   Pulm: Chest expansion is symmetrical, no crackles, rales, rhonchi, or wheezing.  GI: Flat, non distended, soft, non tender, normal bowel sounds.  Skin: Warm, no rashes, no lesions.  MSK: Painful, mildly stiff and enlarged elbows, wrists, MCPs, ankles and MTPs. Point tenderness in the lower back and SI joints.   Neuro: Patient is alert and oriented x3. Speech is clear and fluent, goal directed. Is able to name, repeat and comprehend. Pupils equal, round and reactive to light. Good eye contact. Extra ocular movements intact. Facial and body symmetry. Strength 5 out of 5 in upper and lower extremities. Sensitivity intact. Normal deep tendon reflexes. Normal tone.   Psych: Appropriate mood and affect.     Diagnostic tests  March of 2022:   CBC wnl   CMP wnl   LP: 267/127/44/200  ANAs negative  a1c 5.6%  Rheumathoid factor 75 (high)   CCP ab 225 (strong positive)   ESR 40   TSH " 2.73  CRP 10    Note: I have reviewed all pertinent labs and diagnostic tests associated with this visit with specific comments listed under the assessment and plan below    Assessment and Plan    Rheumatoid arthritis   Lower back pain   Uncontrolled symptoms due to noncompliance. Check HLAB27 and lumbar/scral xray to r/o AS.   - Restart Methotrexate at 7.5 mg/7 days until next visit   - Recheck baseline CRP and ESR   - Rheumatology referral  - Hepatitis panel   - Counseled on avoiding ibuprofen, do tylenol instead    Temperature intolerance  Likely associated with menopausal syndrome but check thyroid, and potentially related to uncontrolled disease as above. Occasional vaginal dryness. No HRT for now.   - Check TSH/T4     Insomnia  Trouble with initiation likely due to altered sleep cycle (night job), potentially contributing to irritability/mood changes.   - Start melatonin with breakfast     - Check lipid panel to address on next visit     Return in about 5 weeks (around 4/2/2024).    Tania ALTAMIRANO PGY-2  Internal Medicine [employer]    Pt has been seen and discussed with the Attending Physician

## 2024-02-27 NOTE — PATIENT INSTRUCTIONS
Tomese los laboratorios esta semana  Luego empiece a jennifer 3 pastillas el Metotrexate con acido folico  La van a llamar para hacer la shereen del reumatologo y los sin X de la espalda

## 2024-03-05 ENCOUNTER — HOSPITAL ENCOUNTER (OUTPATIENT)
Dept: LAB | Facility: MEDICAL CENTER | Age: 54
End: 2024-03-05
Payer: COMMERCIAL

## 2024-03-05 DIAGNOSIS — M05.721 RHEUMATOID ARTHRITIS INVOLVING RIGHT ELBOW WITH POSITIVE RHEUMATOID FACTOR (HCC): ICD-10-CM

## 2024-03-05 LAB
CHOLEST SERPL-MCNC: 207 MG/DL (ref 100–199)
CRP SERPL HS-MCNC: 0.75 MG/DL (ref 0–0.75)
ERYTHROCYTE [SEDIMENTATION RATE] IN BLOOD BY WESTERGREN METHOD: 18 MM/HOUR (ref 0–25)
HAV IGM SERPL QL IA: NORMAL
HBV CORE AB SERPL QL IA: NONREACTIVE
HBV CORE IGM SER QL: NORMAL
HBV SURFACE AG SER QL: NORMAL
HCV AB SER QL: NORMAL
HDLC SERPL-MCNC: 40 MG/DL
LDLC SERPL CALC-MCNC: 144 MG/DL
TRIGL SERPL-MCNC: 113 MG/DL (ref 0–149)
TSH SERPL DL<=0.005 MIU/L-ACNC: 3.71 UIU/ML (ref 0.38–5.33)

## 2024-03-05 PROCEDURE — 80061 LIPID PANEL: CPT

## 2024-03-05 PROCEDURE — 86140 C-REACTIVE PROTEIN: CPT

## 2024-03-05 PROCEDURE — 84443 ASSAY THYROID STIM HORMONE: CPT

## 2024-03-05 PROCEDURE — 85652 RBC SED RATE AUTOMATED: CPT

## 2024-03-05 PROCEDURE — 80074 ACUTE HEPATITIS PANEL: CPT

## 2024-03-05 PROCEDURE — 86704 HEP B CORE ANTIBODY TOTAL: CPT

## 2024-03-05 PROCEDURE — 36415 COLL VENOUS BLD VENIPUNCTURE: CPT

## 2024-03-05 PROCEDURE — 86812 HLA TYPING A B OR C: CPT

## 2024-03-07 LAB — HLA-B27 QL FC: NEGATIVE

## 2024-03-26 NOTE — TELEPHONE ENCOUNTER
Received request via: Pharmacy    Was the patient seen in the last year in this department? Yes    Does the patient have an active prescription (recently filled or refills available) for medication(s) requested? No    Pharmacy Name: [facility]    Does the patient have [facility] and need 100 day supply (blood pressure, diabetes and cholesterol meds only)? Patient does not have S[facility]

## 2024-03-27 RX ORDER — LANOLIN ALCOHOL/MO/W.PET/CERES
3 CREAM (GRAM) TOPICAL
Qty: 90 TABLET | Refills: 0 | Status: SHIPPED | OUTPATIENT
Start: 2024-03-27

## 2024-04-30 RX ORDER — METHOTREXATE 2.5 MG/1
7.5 TABLET ORAL
Qty: 21 TABLET | Refills: 0 | Status: SHIPPED | OUTPATIENT
Start: 2024-04-30 | End: 2024-06-18

## 2024-04-30 NOTE — TELEPHONE ENCOUNTER
Received request via: Pharmacy    Was the patient seen in the last year in this department? Yes    Does the patient have an active prescription (recently filled or refills available) for medication(s) requested? No    Pharmacy Name: [facility]    Does the patient have [facility] and need 100 day supply (blood pressure, diabetes and cholesterol meds only)? Patient does not have SCP

## 2025-06-27 ENCOUNTER — APPOINTMENT (OUTPATIENT)
Dept: INTERNAL MEDICINE | Facility: OTHER | Age: 55
End: 2025-06-27
Payer: COMMERCIAL

## 2025-07-14 ENCOUNTER — APPOINTMENT (OUTPATIENT)
Dept: INTERNAL MEDICINE | Facility: OTHER | Age: 55
End: 2025-07-14
Payer: COMMERCIAL

## 2025-07-14 ENCOUNTER — HOSPITAL ENCOUNTER (OUTPATIENT)
Dept: LAB | Facility: MEDICAL CENTER | Age: 55
End: 2025-07-14
Payer: COMMERCIAL

## 2025-07-14 VITALS
HEIGHT: 64 IN | TEMPERATURE: 98.6 F | SYSTOLIC BLOOD PRESSURE: 109 MMHG | DIASTOLIC BLOOD PRESSURE: 73 MMHG | WEIGHT: 174.6 LBS | BODY MASS INDEX: 29.81 KG/M2 | OXYGEN SATURATION: 98 % | HEART RATE: 62 BPM

## 2025-07-14 DIAGNOSIS — Z13.228 SCREENING FOR METABOLIC DISORDER: ICD-10-CM

## 2025-07-14 DIAGNOSIS — M05.721 RHEUMATOID ARTHRITIS INVOLVING RIGHT ELBOW WITH POSITIVE RHEUMATOID FACTOR (HCC): Primary | ICD-10-CM

## 2025-07-14 DIAGNOSIS — M05.721 RHEUMATOID ARTHRITIS INVOLVING RIGHT ELBOW WITH POSITIVE RHEUMATOID FACTOR (HCC): ICD-10-CM

## 2025-07-14 LAB
ALBUMIN SERPL BCP-MCNC: 4.1 G/DL (ref 3.2–4.9)
ALBUMIN/GLOB SERPL: 1.4 G/DL
ALP SERPL-CCNC: 76 U/L (ref 30–99)
ALT SERPL-CCNC: 16 U/L (ref 2–50)
ANION GAP SERPL CALC-SCNC: 12 MMOL/L (ref 7–16)
AST SERPL-CCNC: 21 U/L (ref 12–45)
BASOPHILS # BLD AUTO: 0.9 % (ref 0–1.8)
BASOPHILS # BLD: 0.05 K/UL (ref 0–0.12)
BILIRUB SERPL-MCNC: 0.4 MG/DL (ref 0.1–1.5)
BUN SERPL-MCNC: 10 MG/DL (ref 8–22)
CALCIUM ALBUM COR SERPL-MCNC: 9.2 MG/DL (ref 8.5–10.5)
CALCIUM SERPL-MCNC: 9.3 MG/DL (ref 8.5–10.5)
CHLORIDE SERPL-SCNC: 109 MMOL/L (ref 96–112)
CHOLEST SERPL-MCNC: 248 MG/DL (ref 100–199)
CO2 SERPL-SCNC: 21 MMOL/L (ref 20–33)
CREAT SERPL-MCNC: 0.59 MG/DL (ref 0.5–1.4)
CREAT UR-MCNC: 95.9 MG/DL
CRP SERPL HS-MCNC: <0.3 MG/DL (ref 0–0.75)
EOSINOPHIL # BLD AUTO: 0.18 K/UL (ref 0–0.51)
EOSINOPHIL NFR BLD: 3.1 % (ref 0–6.9)
ERYTHROCYTE [DISTWIDTH] IN BLOOD BY AUTOMATED COUNT: 43.6 FL (ref 35.9–50)
ERYTHROCYTE [SEDIMENTATION RATE] IN BLOOD BY WESTERGREN METHOD: 16 MM/HOUR (ref 0–25)
EST. AVERAGE GLUCOSE BLD GHB EST-MCNC: 114 MG/DL
GFR SERPLBLD CREATININE-BSD FMLA CKD-EPI: 106 ML/MIN/1.73 M 2
GLOBULIN SER CALC-MCNC: 2.9 G/DL (ref 1.9–3.5)
GLUCOSE SERPL-MCNC: 87 MG/DL (ref 65–99)
HBA1C MFR BLD: 5.6 % (ref 4–5.6)
HCT VFR BLD AUTO: 40.5 % (ref 37–47)
HDLC SERPL-MCNC: 46 MG/DL
HGB BLD-MCNC: 12.8 G/DL (ref 12–16)
IMM GRANULOCYTES # BLD AUTO: 0.01 K/UL (ref 0–0.11)
IMM GRANULOCYTES NFR BLD AUTO: 0.2 % (ref 0–0.9)
LDLC SERPL CALC-MCNC: 181 MG/DL
LYMPHOCYTES # BLD AUTO: 2.69 K/UL (ref 1–4.8)
LYMPHOCYTES NFR BLD: 46.9 % (ref 22–41)
MCH RBC QN AUTO: 27.2 PG (ref 27–33)
MCHC RBC AUTO-ENTMCNC: 31.6 G/DL (ref 32.2–35.5)
MCV RBC AUTO: 86.2 FL (ref 81.4–97.8)
MICROALBUMIN UR-MCNC: <1.2 MG/DL
MICROALBUMIN/CREAT UR: NORMAL MG/G (ref 0–30)
MONOCYTES # BLD AUTO: 0.56 K/UL (ref 0–0.85)
MONOCYTES NFR BLD AUTO: 9.8 % (ref 0–13.4)
NEUTROPHILS # BLD AUTO: 2.24 K/UL (ref 1.82–7.42)
NEUTROPHILS NFR BLD: 39.1 % (ref 44–72)
NRBC # BLD AUTO: 0 K/UL
NRBC BLD-RTO: 0 /100 WBC (ref 0–0.2)
PLATELET # BLD AUTO: 256 K/UL (ref 164–446)
PMV BLD AUTO: 11 FL (ref 9–12.9)
POTASSIUM SERPL-SCNC: 4.4 MMOL/L (ref 3.6–5.5)
PROT SERPL-MCNC: 7 G/DL (ref 6–8.2)
RBC # BLD AUTO: 4.7 M/UL (ref 4.2–5.4)
SODIUM SERPL-SCNC: 142 MMOL/L (ref 135–145)
TRIGL SERPL-MCNC: 103 MG/DL (ref 0–149)
WBC # BLD AUTO: 5.7 K/UL (ref 4.8–10.8)

## 2025-07-14 PROCEDURE — 82570 ASSAY OF URINE CREATININE: CPT

## 2025-07-14 PROCEDURE — 85025 COMPLETE CBC W/AUTO DIFF WBC: CPT

## 2025-07-14 PROCEDURE — 82043 UR ALBUMIN QUANTITATIVE: CPT

## 2025-07-14 PROCEDURE — 86140 C-REACTIVE PROTEIN: CPT

## 2025-07-14 PROCEDURE — 80061 LIPID PANEL: CPT

## 2025-07-14 PROCEDURE — 85652 RBC SED RATE AUTOMATED: CPT

## 2025-07-14 PROCEDURE — 3074F SYST BP LT 130 MM HG: CPT

## 2025-07-14 PROCEDURE — 99213 OFFICE O/P EST LOW 20 MIN: CPT | Mod: GE

## 2025-07-14 PROCEDURE — 83036 HEMOGLOBIN GLYCOSYLATED A1C: CPT

## 2025-07-14 PROCEDURE — 36415 COLL VENOUS BLD VENIPUNCTURE: CPT

## 2025-07-14 PROCEDURE — 80053 COMPREHEN METABOLIC PANEL: CPT

## 2025-07-14 PROCEDURE — 3078F DIAST BP <80 MM HG: CPT

## 2025-07-14 RX ORDER — FOLIC ACID 1 MG/1
1 TABLET ORAL DAILY
Qty: 90 TABLET | Refills: 1 | Status: SHIPPED | OUTPATIENT
Start: 2025-07-14

## 2025-07-14 RX ORDER — METHOTREXATE 2.5 MG/1
7.5 TABLET ORAL
Qty: 18 TABLET | Refills: 0 | Status: SHIPPED | OUTPATIENT
Start: 2025-07-14

## 2025-07-14 ASSESSMENT — PATIENT HEALTH QUESTIONNAIRE - PHQ9: CLINICAL INTERPRETATION OF PHQ2 SCORE: 0

## 2025-07-14 NOTE — PROGRESS NOTES
Office Visit Initial Encounter    Chief Complaint   Patient presents with    Arthritis     Stiff arm used to get medication for it and needs to get back on it     Medication Refill     Folic acid, melatonin        HPI   Ms. Lockett is a 52 year old woman presenting to the office to re-establish care.     Patient has a history of rheumatoid arthritis diagnosed in 2022.  She states she was prescribed methotrexate last year which helped resolve her symptoms.  She was feeling better she stopped taking the medication thinking that her symptoms had improved.  She also never followed up with her rheumatologist regarding this.  Patient states over the past few weeks her pain around her wrists and right elbow have returned and she continues to have stiffness worse in the evenings than in the mornings.  She currently takes ibuprofen for relief.  Patient requested medications to help with the stiffness and pain.    PMHx of:  Rheumatoid arthritis - diagnosed in 2022, RF high 75, CCP Ab 225 strong positive, LISSETTE neg; B/L wrist pain and low back pain/ stiffness. Methotrexate 7.5 mg/week  Carpal tunnel syndrome   Insomnia -resolved  Low back pain - ESR/CRP wnl , HLA B27 neg      Past Medical History  Past Medical History[1]    Allergies:     Patient has no known allergies.    Medications    Current Outpatient Medications:     methotrexate, 7.5 mg, Oral, Q7 DAYS, Taking    folic acid, 1 mg, Oral, DAILY, Taking    Ibuprofen, Take  by mouth., Taking    Family History  No family history on file.    Surgical History  Past Surgical History[2]    Social History   Social History[3]    ROS     General: No fevers, chills, night sweats, weight loss or gain.  H&N: No hearing changes, vision changes, eye pain, ear pain, nasal discharge, sore throat, no neck swelling.  Pulmonary: No shortness of breath, cough, sputum, or hemoptysis.  Cardiovascular: No chest pain, palpitations, or LE swelling.   GI: No nausea, vomiting, diarrhea,  "constipation, abdominal pain, hematochezia or melena.  : No dysuria, frequency, retention or hematuria.  MSK: B/L wrist pain and right elbow pain and stiffness   Neuro: No headaches, no lightheadedness, no dizziness. No focal weakness, no general weakness. No gait disturbances.   Psych: No anxiety or depression.     Physical Exam     /73 (BP Location: Left arm, Patient Position: Sitting, BP Cuff Size: Adult)   Pulse 62   Temp 37 °C (98.6 °F) (Temporal)   Ht 1.626 m (5' 4\")   Wt 79.2 kg (174 lb 9.6 oz)   SpO2 98%   BMI 29.97 kg/m²       General: No acute distress, comfortable.   Head and Neck: NC/AT, EOMI, no scleral icterus or conjunctival pallor, no nasal discharge or oral erythema or exudates. Neck supple, no LADs.   CV: Rhythmic heart sounds, no murmurs, gallops or rubs. No S3,S4 or JVD. Dorsalis pedis/posterior tibial pulses present, symmetrical.   Pulm: Chest expansion is symmetrical, no crackles, rales, rhonchi, or wheezing.  GI: Flat, non distended, soft, non tender, normal bowel sounds.  Skin: Warm, no rashes, no lesions.  MSK: Normal ROM. No lower extremity edema. No lesions. Stiffness over right elbow, 4/5 strength over right arm  Neuro: Patient is alert and oriented x3. Speech is clear and fluent, goal directed. Is able to name, repeat and comprehend. Pupils equal, round and reactive to light. Good eye contact. Extra ocular movements intact. Facial and body symmetry without obvious focal deficit.   Psych: Appropriate mood and affect.     Diagnostic tests  N/A    I have reviewed all pertinent labs and diagnostic tests associated with this visit with specific comments listed under the assessment and plan below    Assessment and Plan    1. Rheumatoid arthritis involving right elbow with positive rheumatoid factor (HCC) (Primary)  Has a history of rheumatoid arthritis diagnosed in 2022.  She states she was prescribed methotrexate last year which helped resolve her symptoms.  She was feeling " better she stopped taking the medication thinking that her symptoms had improved.  She also never followed up with her rheumatologist regarding this.  Patient states over the past few weeks her pain around her wrists and right elbow have returned and she continues to have stiffness worse in the evenings than in the mornings.  She currently takes ibuprofen for relief.  RF high 75, CCP Ab 225 strong positive, LISSETTE negative.   - Provided methotrexate to 7.5 mg once a week for 6 weeks.  Will uptitrate by 5 mg in the next visit if symptoms persist/worsen.  - Referral to rheumatology placed, encouraged to follow-up with them.  - Refills for folic acid 1 mg provided to the patient.  - Follow-up on CBC, ESR and CRP in the next visit.  Advised to complete blood tests prior to starting methotrexate.    - methotrexate 2.5 MG tablet; Take 3 Tablets by mouth every 7 days.  Dispense: 18 Tablet; Refill: 0  - Referral to Rheumatology  - folic acid (FOLVITE) 1 MG Tab; Take 1 Tablet by mouth every day.  Dispense: 90 Tablet; Refill: 1  - CBC WITH DIFFERENTIAL; Future  - Sed Rate; Future  - CRP QUANTITIVE (NON-CARDIAC); Future    2. Screening for metabolic disorder  Screening for metabolic disorders in the setting of elevated BMI 29.97.  - Follow-up on A1c, lipid panel, CMP and microalbumin creatinine ratio in the next visit.    - MICROALBUMIN CREAT RATIO URINE; Future  - HEMOGLOBIN A1C; Future  - Lipid Profile; Future  - Comp Metabolic Panel; Future        Return in about 6 weeks (around 8/25/2025).      Frances Singleton MD MPH  PGY-1 Internal Medicine Resident [employer]    Pt has been seen and discussed with the Attending Physician         [1]   Past Medical History:  Diagnosis Date    Carpal tunnel syndrome of left wrist 2/16/2012    Status post tonsillectomy 11/3/1990   [2]   Past Surgical History:  Procedure Laterality Date    TONSILLECTOMY     [3]   Social History  Tobacco Use    Smoking status: Never    Smokeless tobacco: Never   Vaping  Use    Vaping status: Never Used   Substance Use Topics    Alcohol use: No    Drug use: No

## 2025-08-10 DIAGNOSIS — M05.721 RHEUMATOID ARTHRITIS INVOLVING RIGHT ELBOW WITH POSITIVE RHEUMATOID FACTOR (HCC): ICD-10-CM

## 2025-08-12 RX ORDER — METHOTREXATE 2.5 MG/1
7.5 TABLET ORAL
Qty: 12 TABLET | Refills: 1 | Status: SHIPPED | OUTPATIENT
Start: 2025-08-12 | End: 2025-08-26 | Stop reason: SDUPTHER

## 2025-08-26 ENCOUNTER — OFFICE VISIT (OUTPATIENT)
Dept: INTERNAL MEDICINE | Facility: OTHER | Age: 55
End: 2025-08-26
Payer: COMMERCIAL

## 2025-08-26 VITALS
SYSTOLIC BLOOD PRESSURE: 112 MMHG | OXYGEN SATURATION: 98 % | WEIGHT: 183.4 LBS | BODY MASS INDEX: 31.31 KG/M2 | TEMPERATURE: 97.5 F | HEART RATE: 65 BPM | HEIGHT: 64 IN | DIASTOLIC BLOOD PRESSURE: 70 MMHG

## 2025-08-26 DIAGNOSIS — E66.9 OBESITY WITH BODY MASS INDEX 30 OR GREATER: ICD-10-CM

## 2025-08-26 DIAGNOSIS — E78.2 MODERATE MIXED HYPERLIPIDEMIA NOT REQUIRING STATIN THERAPY: ICD-10-CM

## 2025-08-26 DIAGNOSIS — M05.721 RHEUMATOID ARTHRITIS INVOLVING RIGHT ELBOW WITH POSITIVE RHEUMATOID FACTOR (HCC): ICD-10-CM

## 2025-08-26 DIAGNOSIS — Z12.11 SCREENING FOR COLON CANCER: Primary | ICD-10-CM

## 2025-08-26 RX ORDER — METHOTREXATE 2.5 MG/1
7.5 TABLET ORAL
Qty: 12 TABLET | Refills: 4 | Status: SHIPPED | OUTPATIENT
Start: 2025-08-26

## 2025-08-26 RX ORDER — MELOXICAM 7.5 MG/1
7.5 TABLET ORAL 2 TIMES DAILY PRN
Qty: 90 TABLET | Refills: 0 | Status: SHIPPED | OUTPATIENT
Start: 2025-08-26

## 2025-08-26 RX ORDER — METHOTREXATE 2.5 MG/1
7.5 TABLET ORAL
Qty: 12 TABLET | Refills: 1 | Status: SHIPPED | OUTPATIENT
Start: 2025-08-26 | End: 2025-08-26

## 2025-08-26 ASSESSMENT — ENCOUNTER SYMPTOMS
SPUTUM PRODUCTION: 0
CHILLS: 0
BACK PAIN: 1
FEVER: 0
PALPITATIONS: 0
HEADACHES: 0
WEIGHT LOSS: 0
DOUBLE VISION: 0
DEPRESSION: 0
SHORTNESS OF BREATH: 0
COUGH: 0
DIARRHEA: 0
ORTHOPNEA: 0
HEMOPTYSIS: 0
NAUSEA: 0
ABDOMINAL PAIN: 0
HEARTBURN: 0
VOMITING: 0
MYALGIAS: 0
DIZZINESS: 0
WHEEZING: 0
BLURRED VISION: 0

## 2025-08-26 ASSESSMENT — PAIN SCALES - GENERAL: PAINLEVEL_OUTOF10: 2=MINIMAL-SLIGHT

## 2025-08-26 ASSESSMENT — FIBROSIS 4 INDEX: FIB4 SCORE: 1.13
